# Patient Record
Sex: FEMALE | Race: WHITE | NOT HISPANIC OR LATINO | Employment: FULL TIME | URBAN - METROPOLITAN AREA
[De-identification: names, ages, dates, MRNs, and addresses within clinical notes are randomized per-mention and may not be internally consistent; named-entity substitution may affect disease eponyms.]

---

## 2023-02-08 PROBLEM — G56.03 CARPAL TUNNEL SYNDROME, BILATERAL UPPER LIMBS: Status: ACTIVE | Noted: 2023-02-08

## 2023-02-08 PROBLEM — E55.9 VITAMIN D DEFICIENCY: Status: ACTIVE | Noted: 2023-02-08

## 2023-02-08 PROBLEM — F41.9 ANXIETY: Status: ACTIVE | Noted: 2023-02-08

## 2023-02-08 PROBLEM — F17.200 TOBACCO DEPENDENCE: Status: ACTIVE | Noted: 2023-02-08

## 2023-02-08 PROBLEM — E66.01 MORBID OBESITY (MULTI): Status: ACTIVE | Noted: 2023-02-08

## 2023-02-08 PROBLEM — J45.909 AB (ASTHMATIC BRONCHITIS) (HHS-HCC): Status: ACTIVE | Noted: 2023-02-08

## 2023-02-08 PROBLEM — E78.5 HLD (HYPERLIPIDEMIA): Status: ACTIVE | Noted: 2023-02-08

## 2023-02-08 RX ORDER — BUPROPION HYDROCHLORIDE 150 MG/1
1 TABLET ORAL DAILY
COMMUNITY
Start: 2021-03-23 | End: 2023-09-13 | Stop reason: SDUPTHER

## 2023-02-08 RX ORDER — CETIRIZINE HYDROCHLORIDE 10 MG/1
1 TABLET ORAL DAILY
COMMUNITY
Start: 2021-04-20

## 2023-02-08 RX ORDER — SERTRALINE HYDROCHLORIDE 100 MG/1
1 TABLET, FILM COATED ORAL
COMMUNITY
Start: 2017-01-28 | End: 2023-09-13 | Stop reason: SDUPTHER

## 2023-03-15 DIAGNOSIS — E55.9 VITAMIN D DEFICIENCY: Primary | ICD-10-CM

## 2023-03-15 LAB
ALANINE AMINOTRANSFERASE (SGPT) (U/L) IN SER/PLAS: 22 U/L (ref 7–45)
ALBUMIN (G/DL) IN SER/PLAS: 4.3 G/DL (ref 3.4–5)
ALKALINE PHOSPHATASE (U/L) IN SER/PLAS: 70 U/L (ref 33–110)
ANION GAP IN SER/PLAS: 14 MMOL/L (ref 10–20)
ASPARTATE AMINOTRANSFERASE (SGOT) (U/L) IN SER/PLAS: 19 U/L (ref 9–39)
BILIRUBIN TOTAL (MG/DL) IN SER/PLAS: 0.6 MG/DL (ref 0–1.2)
CALCIDIOL (25 OH VITAMIN D3) (NG/ML) IN SER/PLAS: 18 NG/ML
CALCIUM (MG/DL) IN SER/PLAS: 9.2 MG/DL (ref 8.6–10.3)
CARBON DIOXIDE, TOTAL (MMOL/L) IN SER/PLAS: 24 MMOL/L (ref 21–32)
CHLORIDE (MMOL/L) IN SER/PLAS: 105 MMOL/L (ref 98–107)
CHOLESTEROL (MG/DL) IN SER/PLAS: 294 MG/DL (ref 0–199)
CHOLESTEROL IN HDL (MG/DL) IN SER/PLAS: 42.8 MG/DL
CHOLESTEROL/HDL RATIO: 6.9
CREATININE (MG/DL) IN SER/PLAS: 0.98 MG/DL (ref 0.5–1.05)
ERYTHROCYTE DISTRIBUTION WIDTH (RATIO) BY AUTOMATED COUNT: 12.8 % (ref 11.5–14.5)
ERYTHROCYTE MEAN CORPUSCULAR HEMOGLOBIN CONCENTRATION (G/DL) BY AUTOMATED: 34 G/DL (ref 32–36)
ERYTHROCYTE MEAN CORPUSCULAR VOLUME (FL) BY AUTOMATED COUNT: 97 FL (ref 80–100)
ERYTHROCYTES (10*6/UL) IN BLOOD BY AUTOMATED COUNT: 4.56 X10E12/L (ref 4–5.2)
GFR FEMALE: 71 ML/MIN/1.73M2
GLUCOSE (MG/DL) IN SER/PLAS: 99 MG/DL (ref 74–99)
HEMATOCRIT (%) IN BLOOD BY AUTOMATED COUNT: 44.1 % (ref 36–46)
HEMOGLOBIN (G/DL) IN BLOOD: 15 G/DL (ref 12–16)
LDL: 198 MG/DL (ref 0–99)
LEUKOCYTES (10*3/UL) IN BLOOD BY AUTOMATED COUNT: 5.8 X10E9/L (ref 4.4–11.3)
NON HDL CHOLESTEROL: 251 MG/DL
PLATELETS (10*3/UL) IN BLOOD AUTOMATED COUNT: 212 X10E9/L (ref 150–450)
POTASSIUM (MMOL/L) IN SER/PLAS: 3.9 MMOL/L (ref 3.5–5.3)
PROTEIN TOTAL: 7.1 G/DL (ref 6.4–8.2)
SODIUM (MMOL/L) IN SER/PLAS: 139 MMOL/L (ref 136–145)
TRIGLYCERIDE (MG/DL) IN SER/PLAS: 266 MG/DL (ref 0–149)
UREA NITROGEN (MG/DL) IN SER/PLAS: 10 MG/DL (ref 6–23)
VLDL: 53 MG/DL (ref 0–40)

## 2023-03-16 RX ORDER — ERGOCALCIFEROL 1.25 MG/1
50000 CAPSULE ORAL
Qty: 12 CAPSULE | Refills: 0 | Status: SHIPPED | OUTPATIENT
Start: 2023-03-16 | End: 2023-06-08

## 2023-03-21 ENCOUNTER — APPOINTMENT (OUTPATIENT)
Dept: PRIMARY CARE | Facility: CLINIC | Age: 48
End: 2023-03-21
Payer: COMMERCIAL

## 2023-03-31 ENCOUNTER — OFFICE VISIT (OUTPATIENT)
Dept: PRIMARY CARE | Facility: CLINIC | Age: 48
End: 2023-03-31
Payer: COMMERCIAL

## 2023-03-31 VITALS
WEIGHT: 219 LBS | BODY MASS INDEX: 40.3 KG/M2 | DIASTOLIC BLOOD PRESSURE: 60 MMHG | HEIGHT: 62 IN | SYSTOLIC BLOOD PRESSURE: 110 MMHG | TEMPERATURE: 97.3 F

## 2023-03-31 DIAGNOSIS — E78.5 HYPERLIPIDEMIA, UNSPECIFIED HYPERLIPIDEMIA TYPE: Primary | ICD-10-CM

## 2023-03-31 PROCEDURE — 36415 COLL VENOUS BLD VENIPUNCTURE: CPT | Performed by: FAMILY MEDICINE

## 2023-03-31 PROCEDURE — 99213 OFFICE O/P EST LOW 20 MIN: CPT | Performed by: FAMILY MEDICINE

## 2023-03-31 PROCEDURE — 84443 ASSAY THYROID STIM HORMONE: CPT

## 2023-03-31 ASSESSMENT — PATIENT HEALTH QUESTIONNAIRE - PHQ9
SUM OF ALL RESPONSES TO PHQ9 QUESTIONS 1 AND 2: 0
1. LITTLE INTEREST OR PLEASURE IN DOING THINGS: NOT AT ALL
2. FEELING DOWN, DEPRESSED OR HOPELESS: NOT AT ALL

## 2023-03-31 NOTE — PROGRESS NOTES
"Chief complaint:   Chief Complaint   Patient presents with    Follow-up     Labs       HPI:  Kerline Burger is a 47 y.o. female who presents for evaluation of her cholesterol. She states she has concerns with her elevated cholesterol levels. Her father has elevated cholesterol.    Physical exam:  /60 (BP Location: Right arm, Patient Position: Sitting)   Temp 36.3 °C (97.3 °F)   Ht 1.575 m (5' 2\")   Wt 99.3 kg (219 lb)   BMI 40.06 kg/m²   General: NAD, well appearing female  Heart: RRR, no mumur appreciated  Lungs: CTAB, no wheezes, rales, rhonchi    Assessment/Plan   Problem List Items Addressed This Visit          Other    HLD (hyperlipidemia) - Primary    Relevant Orders    Tsh With Reflex To Free T4 If Abnormal   Discussed with patient as she has been on thyroid medication in the past but not for years will check, if abnormal will treat. If normal will start Atorvastatin 40 mg PO at bedtime and follow up labs 3-6 mo    Swapna Grady,       The 10-year ASCVD risk score (Deni CANALES, et al., 2019) is: 5.8%    Values used to calculate the score:      Age: 47 years      Sex: Female      Is Non- : No      Diabetic: No      Tobacco smoker: Yes      Systolic Blood Pressure: 110 mmHg      Is BP treated: No      HDL Cholesterol: 42.8 mg/dL      Total Cholesterol: 294 mg/dL    "

## 2023-04-02 LAB — THYROTROPIN (MIU/L) IN SER/PLAS BY DETECTION LIMIT <= 0.05 MIU/L: 2.06 MIU/L (ref 0.44–3.98)

## 2023-04-04 ENCOUNTER — TELEPHONE (OUTPATIENT)
Dept: PRIMARY CARE | Facility: CLINIC | Age: 48
End: 2023-04-04
Payer: COMMERCIAL

## 2023-04-19 DIAGNOSIS — E55.9 VITAMIN D DEFICIENCY: ICD-10-CM

## 2023-04-19 RX ORDER — ERGOCALCIFEROL 1.25 MG/1
CAPSULE ORAL
Qty: 4 CAPSULE | Refills: 2 | OUTPATIENT
Start: 2023-04-19

## 2023-05-23 ENCOUNTER — TELEMEDICINE (OUTPATIENT)
Dept: PRIMARY CARE | Facility: CLINIC | Age: 48
End: 2023-05-23
Payer: COMMERCIAL

## 2023-05-23 DIAGNOSIS — U07.1 COVID-19: Primary | ICD-10-CM

## 2023-05-23 DIAGNOSIS — J45.20 MILD INTERMITTENT CHRONIC ASTHMA WITHOUT COMPLICATION (HHS-HCC): ICD-10-CM

## 2023-05-23 PROBLEM — J45.909 CHRONIC ASTHMA (HHS-HCC): Status: ACTIVE | Noted: 2023-05-23

## 2023-05-23 PROBLEM — F17.200 TOBACCO DEPENDENCE: Status: RESOLVED | Noted: 2023-02-08 | Resolved: 2023-05-23

## 2023-05-23 PROCEDURE — 99213 OFFICE O/P EST LOW 20 MIN: CPT | Performed by: FAMILY MEDICINE

## 2023-05-23 RX ORDER — ALBUTEROL SULFATE 90 UG/1
2 AEROSOL, METERED RESPIRATORY (INHALATION) EVERY 4 HOURS PRN
COMMUNITY
Start: 2019-11-20 | End: 2023-05-23 | Stop reason: SDUPTHER

## 2023-05-23 RX ORDER — ALBUTEROL SULFATE 90 UG/1
2 AEROSOL, METERED RESPIRATORY (INHALATION) EVERY 4 HOURS PRN
Qty: 18 G | Refills: 1 | Status: SHIPPED | OUTPATIENT
Start: 2023-05-23 | End: 2023-05-23

## 2023-05-23 RX ORDER — ALBUTEROL SULFATE 0.83 MG/ML
2.5 SOLUTION RESPIRATORY (INHALATION) EVERY 4 HOURS PRN
Qty: 75 ML | Refills: 1 | Status: SHIPPED | OUTPATIENT
Start: 2023-05-23

## 2023-05-23 RX ORDER — ALBUTEROL SULFATE 0.83 MG/ML
2.5 SOLUTION RESPIRATORY (INHALATION) EVERY 4 HOURS PRN
COMMUNITY
End: 2023-05-23 | Stop reason: SDUPTHER

## 2023-05-23 RX ORDER — LEVALBUTEROL INHALATION SOLUTION 0.31 MG/3ML
1 SOLUTION RESPIRATORY (INHALATION)
Qty: 72 ML | Refills: 11 | Status: SHIPPED | OUTPATIENT
Start: 2023-05-23 | End: 2023-09-13

## 2023-05-23 NOTE — PROGRESS NOTES
Chief complaint:   Chief Complaint   Patient presents with    Covid-19 Home Monitoring Video Visit     Virtual or Telephone Consent    An interactive audio and video telecommunication system which permits real time communications between the patient (at the originating site) and provider (at the distant site) was utilized to provide this telehealth service.   Verbal consent was requested and obtained from Kerline Burger on this date, 05/23/23 for a telehealth visit.     HPI:  Kerline Burger is a 48 y.o. female who presents for evaluation COVID-19 which sx started 5/21/2023. She tested positive today. She has congestion, cough, and fatigue without fevers, myalgias, SOB.      Physical exam:  There were no vitals taken for this visit.  General: NAD, well appearing female, non labored breathing      Assessment/Plan   Problem List Items Addressed This Visit          Respiratory    Chronic asthma    Relevant Medications    albuterol 90 mcg/actuation inhaler    albuterol 2.5 mg /3 mL (0.083 %) nebulizer solution     Other Visit Diagnoses       COVID-19    -  Primary    Relevant Medications    nirmatrelvir-ritonavir (PAXLOVID) 300 mg (150 mg x 2)-100 mg tablet therapy pack        Counseled on EUA of Paxlovid. She was informed it needs to be started within 5 days of sx onset. Rebound COVID-19 possibility discussed and quarantine.  Parameters for seeking emergency care discussed. Albuterol refilled for PRN use.     Swapna Grady DO

## 2023-09-13 ENCOUNTER — OFFICE VISIT (OUTPATIENT)
Dept: PRIMARY CARE | Facility: CLINIC | Age: 48
End: 2023-09-13
Payer: COMMERCIAL

## 2023-09-13 VITALS
SYSTOLIC BLOOD PRESSURE: 124 MMHG | HEIGHT: 62 IN | BODY MASS INDEX: 39.01 KG/M2 | DIASTOLIC BLOOD PRESSURE: 72 MMHG | WEIGHT: 212 LBS

## 2023-09-13 DIAGNOSIS — E66.01 MORBID OBESITY (MULTI): ICD-10-CM

## 2023-09-13 DIAGNOSIS — Z23 NEED FOR TETANUS BOOSTER: ICD-10-CM

## 2023-09-13 DIAGNOSIS — F41.9 ANXIETY: ICD-10-CM

## 2023-09-13 DIAGNOSIS — G56.03 BILATERAL CARPAL TUNNEL SYNDROME: ICD-10-CM

## 2023-09-13 DIAGNOSIS — E78.2 MIXED HYPERLIPIDEMIA: ICD-10-CM

## 2023-09-13 DIAGNOSIS — Z12.31 ENCOUNTER FOR SCREENING MAMMOGRAM FOR MALIGNANT NEOPLASM OF BREAST: ICD-10-CM

## 2023-09-13 DIAGNOSIS — Z00.00 WELLNESS EXAMINATION: Primary | ICD-10-CM

## 2023-09-13 DIAGNOSIS — Z12.11 COLON CANCER SCREENING: ICD-10-CM

## 2023-09-13 DIAGNOSIS — Z23 NEED FOR VACCINATION: ICD-10-CM

## 2023-09-13 DIAGNOSIS — F17.200 TOBACCO USE DISORDER: ICD-10-CM

## 2023-09-13 DIAGNOSIS — J45.20 MILD INTERMITTENT CHRONIC ASTHMA WITHOUT COMPLICATION (HHS-HCC): ICD-10-CM

## 2023-09-13 PROCEDURE — 90686 IIV4 VACC NO PRSV 0.5 ML IM: CPT | Performed by: FAMILY MEDICINE

## 2023-09-13 PROCEDURE — 90472 IMMUNIZATION ADMIN EACH ADD: CPT | Performed by: FAMILY MEDICINE

## 2023-09-13 PROCEDURE — 90715 TDAP VACCINE 7 YRS/> IM: CPT | Performed by: FAMILY MEDICINE

## 2023-09-13 PROCEDURE — 90471 IMMUNIZATION ADMIN: CPT | Performed by: FAMILY MEDICINE

## 2023-09-13 PROCEDURE — 3008F BODY MASS INDEX DOCD: CPT | Performed by: FAMILY MEDICINE

## 2023-09-13 PROCEDURE — 99396 PREV VISIT EST AGE 40-64: CPT | Performed by: FAMILY MEDICINE

## 2023-09-13 RX ORDER — SERTRALINE HYDROCHLORIDE 100 MG/1
100 TABLET, FILM COATED ORAL
Qty: 90 TABLET | Refills: 3 | Status: SHIPPED | OUTPATIENT
Start: 2023-09-13

## 2023-09-13 RX ORDER — ATORVASTATIN CALCIUM 20 MG/1
20 TABLET, FILM COATED ORAL DAILY
Qty: 90 TABLET | Refills: 3 | Status: SHIPPED | OUTPATIENT
Start: 2023-09-13 | End: 2024-09-07

## 2023-09-13 RX ORDER — FLUTICASONE FUROATE 27.5 UG/1
1 SPRAY, METERED NASAL
Qty: 10 G | Refills: 5
Start: 2023-09-13 | End: 2024-09-12

## 2023-09-13 RX ORDER — BUPROPION HYDROCHLORIDE 150 MG/1
150 TABLET ORAL DAILY
Qty: 90 TABLET | Refills: 3 | Status: SHIPPED | OUTPATIENT
Start: 2023-09-13

## 2023-09-13 ASSESSMENT — PROMIS GLOBAL HEALTH SCALE
RATE_PHYSICAL_HEALTH: VERY GOOD
RATE_MENTAL_HEALTH: VERY GOOD
CARRYOUT_SOCIAL_ACTIVITIES: VERY GOOD
RATE_SOCIAL_SATISFACTION: VERY GOOD
RATE_AVERAGE_FATIGUE: MILD
EMOTIONAL_PROBLEMS: SOMETIMES
RATE_AVERAGE_PAIN: 0
CARRYOUT_PHYSICAL_ACTIVITIES: COMPLETELY
RATE_QUALITY_OF_LIFE: EXCELLENT
RATE_GENERAL_HEALTH: EXCELLENT

## 2023-09-13 NOTE — ASSESSMENT & PLAN NOTE
- last labs 3/2023  - 10 year ASCVD risk score of 5.8%, medication discussed and she elects for treatment  - initiated Atorvastatin 20 mg PO daily and recommend repeat labs in 3 mo  - weight loss recommended and low fat/low cholesterol diet with regular cardiovascular exercise

## 2023-09-13 NOTE — ASSESSMENT & PLAN NOTE
- recommend continued efforts at weight loss through healthy diet and exercise habits (morbid obesity, BMI 38), referral to weight loss team and nutrition placed  - continue care with [facility]: Dr. Pinedo  - mammogram ordered   - fasting labs reviewed from 3/2023, elevated cholesterol and low vitamin D  - Influenza vaccination given in office  - Tdap booster given in office  - complete cessation of tobacco products is recommended  - follow up annually

## 2023-09-13 NOTE — ASSESSMENT & PLAN NOTE
- well controlled  - continue Sertraline 100 mg PO daily  - continue Bupropion 150 mg PO daily  - follow up annually, sooner as needed

## 2023-09-13 NOTE — PROGRESS NOTES
"Chief complaint:   Chief Complaint   Patient presents with    Annual Exam     CPE       HPI:  Kerline Burger is a 48 y.o. female who presents for a physical    ROS:  Constitutional:  Denies fevers, chills, night sweats  HEENT: Denies change in vision, change in hearing, sore throat, rhinorrhea, congestion  Cardiovascular: Denies chest pain, SOB, racing heart, slow heart rate, palpitations, leg edema  Pulmonary: Denies cough, wheezing, SOB  Gastrointestinal: Denies abdominal pain, diarrhea, constipation, nausea, vomiting, heartburn  Genitourinary: Denies dysuria, hematuria, incontinence, abnormal vaginal bleeding, abnormal vaginal discharge  Integumentary: Denies rash, new or changed skin lesions  Neuro: Denies headache, numbness, tingling  Musculoskeletal: Denies myalgias, arthralgias, back pain  Psych: denies change in mood, sleeping difficulties  Heme: denies bruising or bleeding     Physical exam:  /72   Ht 1.575 m (5' 2\")   Wt 96.2 kg (212 lb)   BMI 38.78 kg/m²   General: NAD, well appearing female  Head: normocephalic  Ears: EAC patent, TM normal bilaterally  Eyes: EOM intact, PERRLA  Nose: moist  Mouth: moist, good dentition  Heart: RRR, no murmur appreciated  Lungs: CTAB, no wheezes, rales, rhonchi  Abdomen: soft, non tender, no organomegaly  Psych: mood and affect congruent, alert and oriented  MSK: +5/5 gross strength  Neuro: +2/4 patellar and biceps reflexes, sensation grossly intact    Assessment/Plan   Problem List Items Addressed This Visit       Anxiety     - well controlled  - continue Sertraline 100 mg PO daily  - continue Bupropion 150 mg PO daily  - follow up annually, sooner as needed         Relevant Medications    sertraline (Zoloft) 100 mg tablet    Bilateral carpal tunnel syndrome     - persistent sx despite night brace  - referral to ortho placed         Relevant Orders    Referral to Orthopaedic Surgery    HLD (hyperlipidemia)     - last labs 3/2023  - 10 year ASCVD risk score of " 5.8%, medication discussed and she elects for treatment  - initiated Atorvastatin 20 mg PO daily and recommend repeat labs in 3 mo  - weight loss recommended and low fat/low cholesterol diet with regular cardiovascular exercise         Relevant Medications    atorvastatin (Lipitor) 20 mg tablet    Other Relevant Orders    Lipid Panel    Comprehensive Metabolic Panel    Morbid obesity (CMS/HCC)    Relevant Orders    Referral to Comprehensive Weight Loss    Referral to Nutrition Services    Chronic asthma    Relevant Medications    fluticasone (Flonase Sensimist) 27.5 mcg/actuation nasal spray    Wellness examination - Primary     - recommend continued efforts at weight loss through healthy diet and exercise habits (morbid obesity, BMI 38), referral to weight loss team and nutrition placed  - continue care with [facility]: Dr. Pinedo  - mammogram ordered   - fasting labs reviewed from 3/2023, elevated cholesterol and low vitamin D  - Influenza vaccination given in office  - Tdap booster given in office  - complete cessation of tobacco products is recommended  - follow up annually          Other Visit Diagnoses       Need for vaccination        Relevant Orders    Flu vaccine (IIV4) age 6 months and greater, preservative free (Completed)    Encounter for screening mammogram for malignant neoplasm of breast        Relevant Orders    BI mammo bilateral screening tomosynthesis    Tobacco use disorder        Relevant Medications    buPROPion XL (Wellbutrin XL) 150 mg 24 hr tablet    Colon cancer screening        Relevant Orders    Colonoscopy Screening    Need for tetanus booster        Relevant Orders    Tdap vaccine, age 10 years and older  (BOOSTRIX) (Completed)    BMI 38.0-38.9,adult        Relevant Orders    Referral to Comprehensive Weight Loss    Referral to Nutrition Services            Swapna Grady DO

## 2023-12-15 ENCOUNTER — HOSPITAL ENCOUNTER (OUTPATIENT)
Dept: RADIOLOGY | Facility: HOSPITAL | Age: 48
Discharge: HOME | End: 2023-12-15
Payer: COMMERCIAL

## 2023-12-15 VITALS — HEIGHT: 61 IN | WEIGHT: 205 LBS | BODY MASS INDEX: 38.71 KG/M2

## 2023-12-15 DIAGNOSIS — Z12.31 ENCOUNTER FOR SCREENING MAMMOGRAM FOR MALIGNANT NEOPLASM OF BREAST: ICD-10-CM

## 2023-12-15 PROCEDURE — 77063 BREAST TOMOSYNTHESIS BI: CPT | Mod: BILATERAL PROCEDURE | Performed by: RADIOLOGY

## 2023-12-15 PROCEDURE — 77063 BREAST TOMOSYNTHESIS BI: CPT

## 2023-12-15 PROCEDURE — 77067 SCR MAMMO BI INCL CAD: CPT | Mod: BILATERAL PROCEDURE | Performed by: RADIOLOGY

## 2024-04-06 ENCOUNTER — LAB REQUISITION (OUTPATIENT)
Dept: LAB | Facility: HOSPITAL | Age: 49
End: 2024-04-06
Payer: COMMERCIAL

## 2024-04-06 PROCEDURE — 87086 URINE CULTURE/COLONY COUNT: CPT

## 2024-04-08 LAB — BACTERIA UR CULT: NORMAL

## 2024-09-17 ENCOUNTER — APPOINTMENT (OUTPATIENT)
Dept: PRIMARY CARE | Facility: CLINIC | Age: 49
End: 2024-09-17
Payer: COMMERCIAL

## 2024-09-17 VITALS
TEMPERATURE: 96.6 F | HEIGHT: 62 IN | SYSTOLIC BLOOD PRESSURE: 124 MMHG | DIASTOLIC BLOOD PRESSURE: 74 MMHG | WEIGHT: 201 LBS | BODY MASS INDEX: 36.99 KG/M2

## 2024-09-17 DIAGNOSIS — Z12.31 ENCOUNTER FOR SCREENING MAMMOGRAM FOR MALIGNANT NEOPLASM OF BREAST: ICD-10-CM

## 2024-09-17 DIAGNOSIS — E78.2 MIXED HYPERLIPIDEMIA: ICD-10-CM

## 2024-09-17 DIAGNOSIS — Z12.11 COLON CANCER SCREENING: ICD-10-CM

## 2024-09-17 DIAGNOSIS — F41.9 ANXIETY: ICD-10-CM

## 2024-09-17 DIAGNOSIS — J45.20 MILD INTERMITTENT CHRONIC ASTHMA WITHOUT COMPLICATION (HHS-HCC): ICD-10-CM

## 2024-09-17 DIAGNOSIS — Z00.00 WELLNESS EXAMINATION: Primary | ICD-10-CM

## 2024-09-17 DIAGNOSIS — E66.01 MORBID OBESITY (MULTI): ICD-10-CM

## 2024-09-17 DIAGNOSIS — Z23 NEED FOR VACCINATION: ICD-10-CM

## 2024-09-17 DIAGNOSIS — F17.200 TOBACCO USE DISORDER: ICD-10-CM

## 2024-09-17 PROCEDURE — 90471 IMMUNIZATION ADMIN: CPT | Performed by: FAMILY MEDICINE

## 2024-09-17 PROCEDURE — 3008F BODY MASS INDEX DOCD: CPT | Performed by: FAMILY MEDICINE

## 2024-09-17 PROCEDURE — 99396 PREV VISIT EST AGE 40-64: CPT | Performed by: FAMILY MEDICINE

## 2024-09-17 PROCEDURE — 90656 IIV3 VACC NO PRSV 0.5 ML IM: CPT | Performed by: FAMILY MEDICINE

## 2024-09-17 RX ORDER — ATORVASTATIN CALCIUM 20 MG/1
20 TABLET, FILM COATED ORAL DAILY
Qty: 90 TABLET | Refills: 3 | Status: SHIPPED | OUTPATIENT
Start: 2024-09-17 | End: 2025-09-12

## 2024-09-17 RX ORDER — BISMUTH SUBSALICYLATE 262 MG
1 TABLET,CHEWABLE ORAL DAILY
COMMUNITY

## 2024-09-17 RX ORDER — SERTRALINE HYDROCHLORIDE 100 MG/1
100 TABLET, FILM COATED ORAL
Qty: 90 TABLET | Refills: 3 | Status: SHIPPED | OUTPATIENT
Start: 2024-09-17

## 2024-09-17 RX ORDER — ALBUTEROL SULFATE 0.83 MG/ML
2.5 SOLUTION RESPIRATORY (INHALATION) EVERY 4 HOURS PRN
Qty: 75 ML | Refills: 1 | Status: SHIPPED | OUTPATIENT
Start: 2024-09-17

## 2024-09-17 RX ORDER — BUPROPION HYDROCHLORIDE 150 MG/1
150 TABLET ORAL DAILY
Qty: 90 TABLET | Refills: 3 | Status: SHIPPED | OUTPATIENT
Start: 2024-09-17

## 2024-09-17 NOTE — PROGRESS NOTES
"Chief complaint:   Chief Complaint   Patient presents with    Annual Exam     CPE       HPI:  Kerline Burger is a 49 y.o. female who presents for a physical    Exercise; minimal   Tobacco: during pandemic only, quit, last use beginning of summer  Alcohol: 4 drinks on average nightly (beer)  Drugs: none    ROS:  Constitutional:  Denies fevers, chills, night sweats  HEENT: Denies change in vision, change in hearing, sore throat, rhinorrhea, congestion  Cardiovascular: Denies chest pain, SOB, racing heart, slow heart rate, palpitations, leg edema  Pulmonary: Denies cough, wheezing, SOB  Gastrointestinal: Admits to nausea if falls asleep with empty stomach sometimes Denies abdominal pain, diarrhea, constipation, nausea, vomiting, heartburn  Genitourinary: Denies dysuria, hematuria, incontinence, abnormal vaginal bleeding, abnormal vaginal discharge  Integumentary: Denies rash, new or changed skin lesions  Neuro: Denies headache, numbness, tingling  Musculoskeletal:  Admits to tingling in the hands Denies myalgias, arthralgias, back pain  Psych: denies change in mood, sleeping difficulties  Heme: denies bruising or bleeding     Physical exam:  /74   Temp 35.9 °C (96.6 °F)   Ht 1.575 m (5' 2\")   Wt 91.2 kg (201 lb)   BMI 36.76 kg/m²   General: NAD, well appearing female  Head: normocephalic  Ears: EAC patent, TM normal bilaterally  Eyes: EOM intact, PERRLA  Nose: moist  Mouth: moist, good dentition  Heart: RRR, no murmur appreciated  Lungs: CTAB, no wheezes, rales, rhonchi  Abdomen: soft, non tender, no organomegaly  Psych: mood and affect congruent, alert and oriented  MSK: +5/5 gross strength  Neuro: +2/4 patellar and biceps reflexes, sensation grossly intact  Skin: warm and dry    Assessment/Plan   Problem List Items Addressed This Visit       Anxiety    Relevant Medications    sertraline (Zoloft) 100 mg tablet    HLD (hyperlipidemia)    Relevant Medications    atorvastatin (Lipitor) 20 mg tablet    Morbid " obesity (Multi)    Chronic asthma ([facility]-HCC)    Relevant Medications    albuterol 2.5 mg /3 mL (0.083 %) nebulizer solution    Wellness examination - Primary    Relevant Orders    CBC    Comprehensive Metabolic Panel    Lipid Panel    TSH with reflex to Free T4 if abnormal     Other Visit Diagnoses       Need for vaccination        Relevant Orders    Flu vaccine, trivalent, preservative free, age 6 months and greater (Fluraix/Fluzone/Flulaval) (Completed)    Tobacco use disorder        Relevant Medications    buPROPion XL (Wellbutrin XL) 150 mg 24 hr tablet    Encounter for screening mammogram for malignant neoplasm of breast        Relevant Orders    BI mammo bilateral screening tomosynthesis    Colon cancer screening        Relevant Orders    Cologuard® colon cancer screening    BMI 36.0-36.9,adult            Follows with Dr. Mirza: [facility]   Follow up annually for physcial    Swapna Grady DO

## 2024-11-29 LAB — NONINV COLON CA DNA+OCC BLD SCRN STL QL: NEGATIVE

## 2024-12-19 PROBLEM — G56.00 CARPAL TUNNEL SYNDROME: Status: ACTIVE | Noted: 2024-12-19

## 2024-12-20 ENCOUNTER — APPOINTMENT (OUTPATIENT)
Dept: PRIMARY CARE | Facility: CLINIC | Age: 49
End: 2024-12-20
Payer: COMMERCIAL

## 2025-01-21 ENCOUNTER — APPOINTMENT (OUTPATIENT)
Dept: PRIMARY CARE | Facility: CLINIC | Age: 50
End: 2025-01-21
Payer: COMMERCIAL

## 2025-09-19 ENCOUNTER — APPOINTMENT (OUTPATIENT)
Dept: PRIMARY CARE | Facility: CLINIC | Age: 50
End: 2025-09-19
Payer: COMMERCIAL